# Patient Record
(demographics unavailable — no encounter records)

---

## 2024-10-29 NOTE — ASSESSMENT
[FreeTextEntry1] : 87 yo F presents for initial evaluation of sleep disordered breathing [place] DERIK Mike [facility] [phone] PMH: Afib, HTN, HLD, gout, overactive bladder, anxiety DIS for years Was started on Amitriptyline 25 mg at bedtime- took for a few nights, had nightmares Now takes once every couple of days, helps a little with sleep Patient states her family has been telling her for years that she also snores and gasps in her sleep (patient lives alone)  A/P: Chronic insomnia - DIS, poor sleep hygiene. Discussed sleep hygiene/behaviors. Patient agrees with not resting in bed watching tv, turning off lights, reading a book or doing a word search outside of the bedroom. Provided with written hand outs Discussed medication use - I am concerned that she is a fall risk and is also on anticoagulation, patient and I agree against starting medications and cautioned her to be careful when getting out of bed on nights that she takes Amitriptyline.

## 2024-10-29 NOTE — HISTORY OF PRESENT ILLNESS
[FreeTextEntry1] : 87 yo F presents for initial evaluation of sleep disordered breathing Referred by DERIK Mike [facility] [place] [phone] PMH: Afib, HTN, HLD, gout, overactive bladder, anxiety  DIS for years Was started on Amitriptyline 25 mg at bedtime- took for a few nights, had nightmares Now takes once every couple of days, helps a little with sleep  Patient states her family has been telling her for years that she also snores and gasps in her sleep (patient lives alone)  Sleep history:  Has difficulty falling asleep as night Stays up most of the night Goes to bed at 12 am, watches tv, may fall asleep around 3 am, wakes up every 2 hours to urinate, able to get to back to sleep and will get out of bed around 9:30 am. Somewhat refreshed upon awakening, denies morning headaches.   Unable to nap during the day Doesnt drive Never had a sleep study in the past ESS 2  Quality Metrics: Smoking history: remote, ages 18-20 ESS: 2  Vaccines:  COVID:Pfizer x3 Influenza: receives - pending in 2 weeks with PMD Pneumococcal: has received int he past

## 2024-10-29 NOTE — ASSESSMENT
[FreeTextEntry1] : 87 yo F presents for initial evaluation of sleep disordered breathing Referred by DERIK Mike [facility] [place] [phone] PMH: Afib, HTN, HLD, gout, overactive bladder, anxiety DIS for years Was started on Amitriptyline 25 mg at bedtime- took for a few nights, had nightmares Now takes once every couple of days, helps a little with sleep Patient states her family has been telling her for years that she also snores and gasps in her sleep (patient lives alone)  A/P: Chronic insomnia - DIS, poor sleep hygiene. Discussed sleep hygiene/behaviors. Patient agrees with not resting in bed watching tv, turning off lights, reading a book or doing a word search outside of the bedroom. Provided with written hand outs Discussed medication use - I am concerned that she is a fall risk and is also on anticoagulation, patient and I agree against starting medications and cautioned her to be careful when getting out of bed on nights that she takes Amitriptyline.

## 2024-10-29 NOTE — CONSULT LETTER
[Dear  ___] : Dear ~LITTLE, [Consult Letter:] : I had the pleasure of evaluating your patient, [unfilled]. [Please see my note below.] : Please see my note below. [Consult Closing:] : Thank you very much for allowing me to participate in the care of this patient.  If you have any questions, please do not hesitate to contact me. [Sincerely,] : Sincerely, [FreeTextEntry2] : Dr. Knox [FreeTextEntry3] : Lupe Villa MD, FAASM  of Medicine Associate , Fellowship in Pulmonary and Critical Care Medicine Division of Pulmonary, Critical Care & Sleep Medicine [facility]

## 2024-10-29 NOTE — REVIEW OF SYSTEMS
[Diabetes] : diabetes  [Nocturia] : nocturia [Anxious] : anxious [Difficulty Initiating Sleep] : difficulty falling asleep [Difficulty Maintaining Sleep] : difficulty maintaining sleep [Lower Extremity Discomfort] : no lower extremity discomfort [Unusual Sleep Behavior] : no unusual sleep behavior [Hypersomnolence] : not sleeping much more than usual [Negative] : Neurologic [FreeTextEntry3] : per hpi [FreeTextEntry8] : per hpi [FreeTextEntry9] : per hpi

## 2024-10-29 NOTE — HISTORY OF PRESENT ILLNESS
[FreeTextEntry1] : 85 yo F presents for initial evaluation of sleep disordered breathing Referred by DERIK Mike [facility] [place] [phone] PMH: Afib, HTN, HLD, gout, overactive bladder, anxiety  DIS for years Was started on Amitriptyline 25 mg at bedtime- took for a few nights, had nightmares Now takes once every couple of days, helps a little with sleep  Patient states her family has been telling her for years that she also snores and gasps in her sleep (patient lives alone)  Sleep history:  Has difficulty falling asleep as night Stays up most of the night Goes to bed at 12 am, watches tv, may fall asleep around 3 am, wakes up every 2 hours to urinate, able to get to back to sleep and will get out of bed around 9:30 am. Somewhat refreshed upon awakening, denies morning headaches.   Unable to nap during the day Doesnt drive Never had a sleep study in the past ESS 2  Quality Metrics: Smoking history: remote, ages 18-20 ESS: 2  Vaccines:  COVID:Pfizer x3 Influenza: receives - pending in 2 weeks with PMD Pneumococcal: has received int he past

## 2024-10-29 NOTE — CONSULT LETTER
[Dear  ___] : Dear ~LITTLE, [Consult Letter:] : I had the pleasure of evaluating your patient, [unfilled]. [Please see my note below.] : Please see my note below. [Consult Closing:] : Thank you very much for allowing me to participate in the care of this patient.  If you have any questions, please do not hesitate to contact me. [Sincerely,] : Sincerely, [FreeTextEntry2] : Dr. Knox [FreeTextEntry3] : Lupe Villa MD, FAASM  of Medicine Associate , Fellowship in Pulmonary and Critical Care Medicine Division of Pulmonary, Critical Care & Sleep Medicine [facility] at [facility]

## 2024-10-29 NOTE — PHYSICAL EXAM
[General Appearance - Well Developed] : well developed [Well Groomed] : well groomed [General Appearance - Well Nourished] : well nourished [General Appearance - In No Acute Distress] : no acute distress [Normal Conjunctiva] : the conjunctiva exhibited no abnormalities [Eyelids - No Xanthelasma] : the eyelids demonstrated no xanthelasmas [Low Lying Soft Palate] : low lying soft palate [Enlarged Base of the Tongue] : enlargement of the base of the tongue [III] : III [Neck Appearance] : the appearance of the neck was normal [Heart Rate And Rhythm] : heart rate was normal and rhythm regular [Heart Sounds] : normal S1 and S2 [] : no respiratory distress [Respiration, Rhythm And Depth] : normal respiratory rhythm and effort [Exaggerated Use Of Accessory Muscles For Inspiration] : no accessory muscle use [Auscultation Breath Sounds / Voice Sounds] : lungs were clear to auscultation bilaterally [Abnormal Walk] : normal gait [Motor Tone] : muscle strength and tone were normal [Nail Clubbing] : no clubbing of the fingernails [Cyanosis, Localized] : no localized cyanosis [Skin Color & Pigmentation] : normal skin color and pigmentation [Cranial Nerves] : cranial nerves 2-12 were intact [Motor Exam] : the motor exam was normal [Oriented To Time, Place, And Person] : oriented to person, place, and time [Impaired Insight] : insight and judgment were intact [Affect] : the affect was normal [Mood] : the mood was normal